# Patient Record
Sex: MALE | Race: WHITE | NOT HISPANIC OR LATINO
[De-identification: names, ages, dates, MRNs, and addresses within clinical notes are randomized per-mention and may not be internally consistent; named-entity substitution may affect disease eponyms.]

---

## 2017-01-09 ENCOUNTER — APPOINTMENT (OUTPATIENT)
Dept: PHARMACY | Facility: CLINIC | Age: 82
End: 2017-01-09

## 2017-04-06 ENCOUNTER — APPOINTMENT (OUTPATIENT)
Dept: OTOLARYNGOLOGY | Facility: CLINIC | Age: 82
End: 2017-04-06

## 2017-04-06 VITALS
DIASTOLIC BLOOD PRESSURE: 78 MMHG | HEART RATE: 66 BPM | HEIGHT: 69 IN | SYSTOLIC BLOOD PRESSURE: 116 MMHG | WEIGHT: 198 LBS | BODY MASS INDEX: 29.33 KG/M2

## 2017-07-13 ENCOUNTER — APPOINTMENT (OUTPATIENT)
Dept: OTOLARYNGOLOGY | Facility: CLINIC | Age: 82
End: 2017-07-13

## 2017-07-13 VITALS
SYSTOLIC BLOOD PRESSURE: 132 MMHG | BODY MASS INDEX: 29.62 KG/M2 | HEART RATE: 54 BPM | HEIGHT: 69 IN | DIASTOLIC BLOOD PRESSURE: 75 MMHG | WEIGHT: 200 LBS

## 2017-07-13 DIAGNOSIS — J38.00 PARALYSIS OF VOCAL CORDS AND LARYNX, UNSPECIFIED: ICD-10-CM

## 2017-07-13 RX ORDER — METHYLPREDNISOLONE 4 MG/1
4 TABLET ORAL
Qty: 1 | Refills: 3 | Status: ACTIVE | COMMUNITY
Start: 2017-07-13 | End: 1900-01-01

## 2017-08-18 ENCOUNTER — APPOINTMENT (OUTPATIENT)
Dept: OTOLARYNGOLOGY | Facility: CLINIC | Age: 82
End: 2017-08-18
Payer: MEDICARE

## 2017-08-18 VITALS
SYSTOLIC BLOOD PRESSURE: 125 MMHG | HEART RATE: 68 BPM | HEIGHT: 69 IN | BODY MASS INDEX: 29.62 KG/M2 | DIASTOLIC BLOOD PRESSURE: 81 MMHG | WEIGHT: 200 LBS

## 2017-08-18 PROCEDURE — 31231 NASAL ENDOSCOPY DX: CPT

## 2017-08-18 PROCEDURE — 99214 OFFICE O/P EST MOD 30 MIN: CPT | Mod: 25

## 2017-08-18 PROCEDURE — 69210 REMOVE IMPACTED EAR WAX UNI: CPT

## 2017-08-18 RX ORDER — CIPROFLOXACIN 3 MG/ML
0.3 SOLUTION OPHTHALMIC
Qty: 5 | Refills: 0 | Status: ACTIVE | COMMUNITY
Start: 2017-07-24

## 2017-08-18 RX ORDER — FLUTICASONE PROPIONATE 50 UG/1
50 SPRAY, METERED NASAL DAILY
Qty: 48 | Refills: 3 | Status: ACTIVE | COMMUNITY
Start: 2017-08-18 | End: 1900-01-01

## 2017-10-26 ENCOUNTER — APPOINTMENT (OUTPATIENT)
Dept: OTOLARYNGOLOGY | Facility: CLINIC | Age: 82
End: 2017-10-26
Payer: MEDICARE

## 2017-10-26 VITALS
HEIGHT: 69 IN | HEART RATE: 56 BPM | BODY MASS INDEX: 29.62 KG/M2 | SYSTOLIC BLOOD PRESSURE: 120 MMHG | DIASTOLIC BLOOD PRESSURE: 79 MMHG | WEIGHT: 200 LBS

## 2017-10-26 DIAGNOSIS — R09.82 POSTNASAL DRIP: ICD-10-CM

## 2017-10-26 DIAGNOSIS — R05 COUGH: ICD-10-CM

## 2017-10-26 PROCEDURE — 69210 REMOVE IMPACTED EAR WAX UNI: CPT

## 2017-10-26 PROCEDURE — 99214 OFFICE O/P EST MOD 30 MIN: CPT | Mod: 25

## 2018-01-11 ENCOUNTER — APPOINTMENT (OUTPATIENT)
Dept: OTOLARYNGOLOGY | Facility: CLINIC | Age: 83
End: 2018-01-11
Payer: MEDICARE

## 2018-01-11 VITALS
BODY MASS INDEX: 29.92 KG/M2 | SYSTOLIC BLOOD PRESSURE: 115 MMHG | WEIGHT: 202 LBS | DIASTOLIC BLOOD PRESSURE: 66 MMHG | HEIGHT: 69 IN | HEART RATE: 73 BPM

## 2018-01-11 PROCEDURE — 69210 REMOVE IMPACTED EAR WAX UNI: CPT

## 2018-01-11 PROCEDURE — 99214 OFFICE O/P EST MOD 30 MIN: CPT | Mod: 25

## 2018-04-05 ENCOUNTER — APPOINTMENT (OUTPATIENT)
Dept: OTOLARYNGOLOGY | Facility: CLINIC | Age: 83
End: 2018-04-05
Payer: MEDICARE

## 2018-04-05 VITALS
WEIGHT: 204 LBS | HEART RATE: 66 BPM | HEIGHT: 69 IN | DIASTOLIC BLOOD PRESSURE: 71 MMHG | BODY MASS INDEX: 30.21 KG/M2 | SYSTOLIC BLOOD PRESSURE: 127 MMHG

## 2018-04-05 DIAGNOSIS — J30.9 ALLERGIC RHINITIS, UNSPECIFIED: ICD-10-CM

## 2018-04-05 PROCEDURE — 69210 REMOVE IMPACTED EAR WAX UNI: CPT

## 2018-04-05 PROCEDURE — 99214 OFFICE O/P EST MOD 30 MIN: CPT | Mod: 25

## 2018-04-05 RX ORDER — FLUTICASONE PROPIONATE 50 UG/1
50 SPRAY, METERED NASAL DAILY
Qty: 3 | Refills: 3 | Status: ACTIVE | COMMUNITY
Start: 2018-04-05 | End: 1900-01-01

## 2018-04-12 ENCOUNTER — MEDICATION RENEWAL (OUTPATIENT)
Age: 83
End: 2018-04-12

## 2018-04-12 RX ORDER — FLUTICASONE PROPIONATE 50 UG/1
50 SPRAY, METERED NASAL DAILY
Qty: 1 | Refills: 3 | Status: ACTIVE | COMMUNITY
Start: 2018-04-12 | End: 1900-01-01

## 2018-07-12 ENCOUNTER — APPOINTMENT (OUTPATIENT)
Dept: OTOLARYNGOLOGY | Facility: CLINIC | Age: 83
End: 2018-07-12
Payer: MEDICARE

## 2018-07-12 VITALS
HEART RATE: 55 BPM | HEIGHT: 69 IN | SYSTOLIC BLOOD PRESSURE: 123 MMHG | DIASTOLIC BLOOD PRESSURE: 81 MMHG | BODY MASS INDEX: 29.33 KG/M2 | WEIGHT: 198 LBS

## 2018-07-12 PROCEDURE — 99214 OFFICE O/P EST MOD 30 MIN: CPT | Mod: 25

## 2018-07-12 PROCEDURE — 92557 COMPREHENSIVE HEARING TEST: CPT

## 2018-07-12 PROCEDURE — 92567 TYMPANOMETRY: CPT

## 2018-07-12 PROCEDURE — 69210 REMOVE IMPACTED EAR WAX UNI: CPT

## 2018-09-24 RX ORDER — FLUTICASONE PROPIONATE 50 UG/1
50 SPRAY, METERED NASAL DAILY
Qty: 1 | Refills: 3 | Status: ACTIVE | COMMUNITY
Start: 2018-09-24 | End: 1900-01-01

## 2018-10-11 ENCOUNTER — APPOINTMENT (OUTPATIENT)
Dept: OTOLARYNGOLOGY | Facility: CLINIC | Age: 83
End: 2018-10-11
Payer: MEDICARE

## 2018-10-11 VITALS
HEIGHT: 69 IN | WEIGHT: 198 LBS | DIASTOLIC BLOOD PRESSURE: 73 MMHG | SYSTOLIC BLOOD PRESSURE: 114 MMHG | HEART RATE: 61 BPM | BODY MASS INDEX: 29.33 KG/M2

## 2018-10-11 DIAGNOSIS — J34.2 DEVIATED NASAL SEPTUM: ICD-10-CM

## 2018-10-11 PROCEDURE — 99214 OFFICE O/P EST MOD 30 MIN: CPT | Mod: 25

## 2018-10-11 PROCEDURE — 31231 NASAL ENDOSCOPY DX: CPT

## 2018-10-11 PROCEDURE — 69210 REMOVE IMPACTED EAR WAX UNI: CPT

## 2019-01-14 ENCOUNTER — APPOINTMENT (OUTPATIENT)
Dept: OTOLARYNGOLOGY | Facility: CLINIC | Age: 84
End: 2019-01-14
Payer: MEDICARE

## 2019-01-14 VITALS
HEART RATE: 60 BPM | SYSTOLIC BLOOD PRESSURE: 132 MMHG | WEIGHT: 198 LBS | DIASTOLIC BLOOD PRESSURE: 81 MMHG | BODY MASS INDEX: 29.33 KG/M2 | HEIGHT: 69 IN

## 2019-01-14 DIAGNOSIS — R09.81 NASAL CONGESTION: ICD-10-CM

## 2019-01-14 DIAGNOSIS — H61.23 IMPACTED CERUMEN, BILATERAL: ICD-10-CM

## 2019-01-14 PROCEDURE — 69210 REMOVE IMPACTED EAR WAX UNI: CPT

## 2019-01-14 PROCEDURE — 31231 NASAL ENDOSCOPY DX: CPT

## 2019-01-14 PROCEDURE — 99214 OFFICE O/P EST MOD 30 MIN: CPT | Mod: 25

## 2019-01-14 NOTE — HISTORY OF PRESENT ILLNESS
[de-identified] : Patient is here today with no changes in hearing but has issues with batteries int he hearing aids. He does not have any ear pain or pressure and is not having any ringing in the ears. He does not have any issues with ear itching. He does not have any problems with nasal congsetion or runny nose . He has stopped using the flonase since the last visit. He does not have any nosebleeds.

## 2019-01-14 NOTE — PHYSICAL EXAM
[Nasal Endoscopy Performed] : nasal endoscopy was performed, see procedure section for findings [] : septum deviated to the right [Midline] : trachea located in midline position [Normal] : no rashes

## 2019-01-14 NOTE — ASSESSMENT
[FreeTextEntry1] : Patient recently had a new set of hearing aids here for evaluation for cerumen which is massive bilaterally which was curetted out. He also stopped his nasal sprays last visit in an attempt to see if we can wean her off of them for his history of postnasal drip. Endoscopically his deviated septum persist however there is no evidence of tumors masses or polyps in his turbinates are not swelling neck and to continue to be nasal spray unless his symptoms were to return if so we will restart the 4 month. Otherwise she will follow up and see us in 6 months.

## 2019-07-18 ENCOUNTER — APPOINTMENT (OUTPATIENT)
Dept: OTOLARYNGOLOGY | Facility: CLINIC | Age: 84
End: 2019-07-18
Payer: MEDICARE

## 2019-07-18 VITALS
SYSTOLIC BLOOD PRESSURE: 114 MMHG | WEIGHT: 199 LBS | HEIGHT: 69 IN | BODY MASS INDEX: 29.47 KG/M2 | DIASTOLIC BLOOD PRESSURE: 70 MMHG | HEART RATE: 69 BPM

## 2019-07-18 DIAGNOSIS — H90.3 SENSORINEURAL HEARING LOSS, BILATERAL: ICD-10-CM

## 2019-07-18 PROCEDURE — 99214 OFFICE O/P EST MOD 30 MIN: CPT | Mod: 25

## 2019-07-18 PROCEDURE — 92557 COMPREHENSIVE HEARING TEST: CPT

## 2019-07-18 PROCEDURE — 69210 REMOVE IMPACTED EAR WAX UNI: CPT

## 2019-07-18 PROCEDURE — 92567 TYMPANOMETRY: CPT

## 2019-07-18 NOTE — ASSESSMENT
[FreeTextEntry1] : Patient with cerumen impaction curetted out repeat hearing test shows his hearing is stable and will follow up and see us as needed.

## 2019-07-18 NOTE — HISTORY OF PRESENT ILLNESS
[de-identified] : PAtient is here today with some minor ear clogging bilaterally and feels the need for a cleaning. He is not having nay changes in hearing but does admit that he has been turning the TV up louder than usual. he does not have any ringing in the ears or pain. He is not having his ear at home. He does not have any nasal congsetion or runny nose

## 2019-09-25 ENCOUNTER — APPOINTMENT (OUTPATIENT)
Dept: OPHTHALMOLOGY | Facility: CLINIC | Age: 84
End: 2019-09-25
Payer: MEDICARE

## 2019-09-25 ENCOUNTER — NON-APPOINTMENT (OUTPATIENT)
Age: 84
End: 2019-09-25

## 2019-09-25 PROCEDURE — 92002 INTRM OPH EXAM NEW PATIENT: CPT

## 2019-09-25 PROCEDURE — 92012 INTRM OPH EXAM EST PATIENT: CPT

## 2019-10-15 ENCOUNTER — APPOINTMENT (OUTPATIENT)
Dept: OTOLARYNGOLOGY | Facility: CLINIC | Age: 84
End: 2019-10-15
Payer: MEDICARE

## 2019-10-15 VITALS
HEIGHT: 69 IN | BODY MASS INDEX: 28.88 KG/M2 | SYSTOLIC BLOOD PRESSURE: 116 MMHG | HEART RATE: 57 BPM | WEIGHT: 195 LBS | DIASTOLIC BLOOD PRESSURE: 77 MMHG

## 2019-10-15 PROCEDURE — 69210 REMOVE IMPACTED EAR WAX UNI: CPT

## 2019-10-15 PROCEDURE — 99214 OFFICE O/P EST MOD 30 MIN: CPT | Mod: 25

## 2019-10-15 NOTE — HISTORY OF PRESENT ILLNESS
[de-identified] : Patient is using his hearing aids bilaterally with moderate ear clogging bilaterally but great benefit from the aids. He does not have any issues with itching in the ears or dizziness. He is overall doing well. He does not have nay nasal congestion right now/ He does not have any seaosnal alelrgies

## 2019-10-15 NOTE — ASSESSMENT
[FreeTextEntry1] : Patient follows up cerumen impaction bilaterally curetted out will follow up with us as needed

## 2020-01-09 ENCOUNTER — NON-APPOINTMENT (OUTPATIENT)
Age: 85
End: 2020-01-09

## 2020-01-09 ENCOUNTER — APPOINTMENT (OUTPATIENT)
Dept: OPHTHALMOLOGY | Facility: CLINIC | Age: 85
End: 2020-01-09
Payer: MEDICARE

## 2020-01-09 PROCEDURE — 99212 OFFICE O/P EST SF 10 MIN: CPT

## 2020-01-14 ENCOUNTER — APPOINTMENT (OUTPATIENT)
Dept: OTOLARYNGOLOGY | Facility: CLINIC | Age: 85
End: 2020-01-14
Payer: MEDICARE

## 2020-01-14 VITALS
HEIGHT: 69 IN | WEIGHT: 191 LBS | SYSTOLIC BLOOD PRESSURE: 139 MMHG | BODY MASS INDEX: 28.29 KG/M2 | HEART RATE: 61 BPM | DIASTOLIC BLOOD PRESSURE: 88 MMHG

## 2020-01-14 PROCEDURE — 69210 REMOVE IMPACTED EAR WAX UNI: CPT

## 2020-01-14 PROCEDURE — 99214 OFFICE O/P EST MOD 30 MIN: CPT | Mod: 25

## 2020-01-14 NOTE — ASSESSMENT
[FreeTextEntry1] : Patient for an evaluation cerumen impaction bilaterally curetted out otherwise normal exam no change in his hearing he will get his hearing test from his cardiologist.

## 2020-01-14 NOTE — PHYSICAL EXAM
[] : septum deviated to the right [Nasal Endoscopy Performed] : nasal endoscopy was performed, see procedure section for findings [Midline] : trachea located in midline position [Normal] : no rashes

## 2020-01-14 NOTE — HISTORY OF PRESENT ILLNESS
[de-identified] : PAtient continues to have intermittent clogging. of the ears and today feels like he needs an ear cleaning. He isnot having any issues with facial pain or pressure and is not having any nasal congestion. He has nto had any changes in hearing from what he can tell and does not have any dizziness or ringing in the ears

## 2020-03-02 ENCOUNTER — OUTPATIENT (OUTPATIENT)
Dept: OUTPATIENT SERVICES | Facility: HOSPITAL | Age: 85
LOS: 1 days | End: 2020-03-02
Payer: MEDICARE

## 2020-03-02 DIAGNOSIS — E78.00 PURE HYPERCHOLESTEROLEMIA, UNSPECIFIED: ICD-10-CM

## 2020-03-02 PROCEDURE — 93018 CV STRESS TEST I&R ONLY: CPT

## 2020-03-02 PROCEDURE — 93016 CV STRESS TEST SUPVJ ONLY: CPT

## 2020-03-02 PROCEDURE — A9500: CPT

## 2020-03-02 PROCEDURE — 78452 HT MUSCLE IMAGE SPECT MULT: CPT | Mod: 26

## 2020-03-02 PROCEDURE — 78452 HT MUSCLE IMAGE SPECT MULT: CPT

## 2020-03-02 PROCEDURE — 93017 CV STRESS TEST TRACING ONLY: CPT

## 2020-03-10 ENCOUNTER — OUTPATIENT (OUTPATIENT)
Dept: OUTPATIENT SERVICES | Facility: HOSPITAL | Age: 85
LOS: 1 days | End: 2020-03-10
Payer: MEDICARE

## 2020-03-10 DIAGNOSIS — I49.9 CARDIAC ARRHYTHMIA, UNSPECIFIED: ICD-10-CM

## 2020-03-10 PROCEDURE — 93227 XTRNL ECG REC<48 HR R&I: CPT

## 2020-03-16 PROCEDURE — 93225 XTRNL ECG REC<48 HRS REC: CPT

## 2020-04-15 ENCOUNTER — APPOINTMENT (OUTPATIENT)
Dept: OPHTHALMOLOGY | Facility: CLINIC | Age: 85
End: 2020-04-15

## 2020-04-16 ENCOUNTER — APPOINTMENT (OUTPATIENT)
Dept: OTOLARYNGOLOGY | Facility: CLINIC | Age: 85
End: 2020-04-16

## 2020-07-16 ENCOUNTER — APPOINTMENT (OUTPATIENT)
Dept: OTOLARYNGOLOGY | Facility: CLINIC | Age: 85
End: 2020-07-16
Payer: MEDICARE

## 2020-07-16 VITALS
DIASTOLIC BLOOD PRESSURE: 73 MMHG | HEIGHT: 69 IN | HEART RATE: 58 BPM | TEMPERATURE: 98.5 F | WEIGHT: 177 LBS | BODY MASS INDEX: 26.22 KG/M2 | SYSTOLIC BLOOD PRESSURE: 116 MMHG

## 2020-07-16 PROCEDURE — 99214 OFFICE O/P EST MOD 30 MIN: CPT | Mod: 25

## 2020-07-16 PROCEDURE — 69210 REMOVE IMPACTED EAR WAX UNI: CPT

## 2020-07-16 NOTE — HISTORY OF PRESENT ILLNESS
[de-identified] : PAtient is doing well with only minor ear clogging bilaterally and feels he should have an ear cleaning. he does not have any issues with ringing in the ears or dizziness and is nto having any changes in hearing. he does not have any issues with nasal congestion or runny nose right now

## 2020-07-16 NOTE — ASSESSMENT
[FreeTextEntry1] : Patient follows up cerumen impaction bilaterally otherwise doing really well has lost some weight during COVID and looks great actually been working out he will follow-up and see us as needed.

## 2020-07-20 ENCOUNTER — APPOINTMENT (OUTPATIENT)
Dept: OPHTHALMOLOGY | Facility: CLINIC | Age: 85
End: 2020-07-20
Payer: MEDICARE

## 2020-07-20 ENCOUNTER — NON-APPOINTMENT (OUTPATIENT)
Age: 85
End: 2020-07-20

## 2020-07-20 PROCEDURE — 92012 INTRM OPH EXAM EST PATIENT: CPT

## 2020-08-08 ENCOUNTER — APPOINTMENT (OUTPATIENT)
Dept: DISASTER EMERGENCY | Facility: CLINIC | Age: 85
End: 2020-08-08

## 2020-08-08 DIAGNOSIS — Z01.818 ENCOUNTER FOR OTHER PREPROCEDURAL EXAMINATION: ICD-10-CM

## 2020-08-09 LAB — SARS-COV-2 N GENE NPH QL NAA+PROBE: NOT DETECTED

## 2020-08-10 ENCOUNTER — TRANSCRIPTION ENCOUNTER (OUTPATIENT)
Age: 85
End: 2020-08-10

## 2020-08-10 VITALS
SYSTOLIC BLOOD PRESSURE: 160 MMHG | WEIGHT: 171.08 LBS | TEMPERATURE: 97 F | HEART RATE: 58 BPM | HEIGHT: 68 IN | DIASTOLIC BLOOD PRESSURE: 81 MMHG | RESPIRATION RATE: 18 BRPM | OXYGEN SATURATION: 99 %

## 2020-08-10 NOTE — PRE-OP CHECKLIST - PATIENT'S PERSONAL PROPERTY REMOVED
hearing aids/b/l hearing aides; right eye contact lenses hearing aids/b/l hearing aides; right eye contact lenses ( non prescriptive)

## 2020-08-11 ENCOUNTER — INPATIENT (INPATIENT)
Facility: HOSPITAL | Age: 85
LOS: 3 days | Discharge: HOME | DRG: 336 | End: 2020-08-15
Attending: SURGERY | Admitting: SURGERY
Payer: MEDICARE

## 2020-08-11 ENCOUNTER — RESULT REVIEW (OUTPATIENT)
Age: 85
End: 2020-08-11

## 2020-08-11 DIAGNOSIS — Z98.890 OTHER SPECIFIED POSTPROCEDURAL STATES: Chronic | ICD-10-CM

## 2020-08-11 DIAGNOSIS — Z41.9 ENCOUNTER FOR PROCEDURE FOR PURPOSES OTHER THAN REMEDYING HEALTH STATE, UNSPECIFIED: Chronic | ICD-10-CM

## 2020-08-11 LAB — GLUCOSE BLDC GLUCOMTR-MCNC: 98 MG/DL — SIGNIFICANT CHANGE UP (ref 70–99)

## 2020-08-11 PROCEDURE — 88302 TISSUE EXAM BY PATHOLOGIST: CPT | Mod: 26

## 2020-08-11 PROCEDURE — 88309 TISSUE EXAM BY PATHOLOGIST: CPT | Mod: 26

## 2020-08-11 RX ORDER — HEPARIN SODIUM 5000 [USP'U]/ML
5000 INJECTION INTRAVENOUS; SUBCUTANEOUS EVERY 8 HOURS
Refills: 0 | Status: DISCONTINUED | OUTPATIENT
Start: 2020-08-11 | End: 2020-08-15

## 2020-08-11 RX ORDER — CIPROFLOXACIN HCL 0.3 %
1 DROPS OPHTHALMIC (EYE)
Qty: 0 | Refills: 0 | DISCHARGE

## 2020-08-11 RX ORDER — ACETAMINOPHEN 500 MG
1000 TABLET ORAL ONCE
Refills: 0 | Status: COMPLETED | OUTPATIENT
Start: 2020-08-12 | End: 2020-08-12

## 2020-08-11 RX ORDER — ACETAMINOPHEN 500 MG
1000 TABLET ORAL ONCE
Refills: 0 | Status: COMPLETED | OUTPATIENT
Start: 2020-08-11 | End: 2020-08-11

## 2020-08-11 RX ORDER — ONDANSETRON 8 MG/1
4 TABLET, FILM COATED ORAL EVERY 6 HOURS
Refills: 0 | Status: DISCONTINUED | OUTPATIENT
Start: 2020-08-11 | End: 2020-08-15

## 2020-08-11 RX ORDER — PREDNISOLONE SODIUM PHOSPHATE 1 %
1 DROPS OPHTHALMIC (EYE) DAILY
Refills: 0 | Status: DISCONTINUED | OUTPATIENT
Start: 2020-08-12 | End: 2020-08-15

## 2020-08-11 RX ORDER — LATANOPROST 0.05 MG/ML
1 SOLUTION/ DROPS OPHTHALMIC; TOPICAL
Qty: 0 | Refills: 0 | DISCHARGE

## 2020-08-11 RX ORDER — DORZOLAMIDE HYDROCHLORIDE TIMOLOL MALEATE 20; 5 MG/ML; MG/ML
1 SOLUTION/ DROPS OPHTHALMIC
Refills: 0 | Status: DISCONTINUED | OUTPATIENT
Start: 2020-08-11 | End: 2020-08-15

## 2020-08-11 RX ORDER — HYDROMORPHONE HYDROCHLORIDE 2 MG/ML
0.25 INJECTION INTRAMUSCULAR; INTRAVENOUS; SUBCUTANEOUS
Refills: 0 | Status: DISCONTINUED | OUTPATIENT
Start: 2020-08-11 | End: 2020-08-15

## 2020-08-11 RX ORDER — TAMSULOSIN HYDROCHLORIDE 0.4 MG/1
0.4 CAPSULE ORAL AT BEDTIME
Refills: 0 | Status: DISCONTINUED | OUTPATIENT
Start: 2020-08-11 | End: 2020-08-15

## 2020-08-11 RX ORDER — LATANOPROST 0.05 MG/ML
1 SOLUTION/ DROPS OPHTHALMIC; TOPICAL AT BEDTIME
Refills: 0 | Status: DISCONTINUED | OUTPATIENT
Start: 2020-08-11 | End: 2020-08-15

## 2020-08-11 RX ORDER — SODIUM CHLORIDE 9 MG/ML
1000 INJECTION, SOLUTION INTRAVENOUS
Refills: 0 | Status: DISCONTINUED | OUTPATIENT
Start: 2020-08-11 | End: 2020-08-15

## 2020-08-11 RX ORDER — PREDNISOLONE SODIUM PHOSPHATE 1 %
DROPS OPHTHALMIC (EYE)
Refills: 0 | Status: DISCONTINUED | OUTPATIENT
Start: 2020-08-11 | End: 2020-08-15

## 2020-08-11 RX ORDER — DORZOLAMIDE HYDROCHLORIDE TIMOLOL MALEATE 20; 5 MG/ML; MG/ML
1 SOLUTION/ DROPS OPHTHALMIC
Qty: 0 | Refills: 0 | DISCHARGE

## 2020-08-11 RX ORDER — PREDNISOLONE SODIUM PHOSPHATE 1 %
1 DROPS OPHTHALMIC (EYE)
Qty: 0 | Refills: 0 | DISCHARGE

## 2020-08-11 RX ORDER — CEFAZOLIN SODIUM 1 G
1000 VIAL (EA) INJECTION EVERY 8 HOURS
Refills: 0 | Status: DISCONTINUED | OUTPATIENT
Start: 2020-08-11 | End: 2020-08-11

## 2020-08-11 RX ORDER — LISINOPRIL 2.5 MG/1
1 TABLET ORAL
Qty: 0 | Refills: 0 | DISCHARGE

## 2020-08-11 RX ORDER — TAMSULOSIN HYDROCHLORIDE 0.4 MG/1
1 CAPSULE ORAL
Qty: 0 | Refills: 0 | DISCHARGE

## 2020-08-11 RX ORDER — PREDNISOLONE SODIUM PHOSPHATE 1 %
1 DROPS OPHTHALMIC (EYE) ONCE
Refills: 0 | Status: COMPLETED | OUTPATIENT
Start: 2020-08-11 | End: 2020-08-11

## 2020-08-11 RX ORDER — CEFAZOLIN SODIUM 1 G
1000 VIAL (EA) INJECTION EVERY 8 HOURS
Refills: 0 | Status: COMPLETED | OUTPATIENT
Start: 2020-08-11 | End: 2020-08-12

## 2020-08-11 RX ORDER — CIPROFLOXACIN HCL 0.3 %
1 DROPS OPHTHALMIC (EYE)
Refills: 0 | Status: DISCONTINUED | OUTPATIENT
Start: 2020-08-11 | End: 2020-08-15

## 2020-08-11 RX ADMIN — Medication 1 DROP(S): at 18:52

## 2020-08-11 RX ADMIN — Medication 100 MILLIGRAM(S): at 21:42

## 2020-08-11 RX ADMIN — HYDROMORPHONE HYDROCHLORIDE 0.25 MILLIGRAM(S): 2 INJECTION INTRAMUSCULAR; INTRAVENOUS; SUBCUTANEOUS at 14:25

## 2020-08-11 RX ADMIN — HYDROMORPHONE HYDROCHLORIDE 0.25 MILLIGRAM(S): 2 INJECTION INTRAMUSCULAR; INTRAVENOUS; SUBCUTANEOUS at 14:42

## 2020-08-11 RX ADMIN — Medication 1000 MILLIGRAM(S): at 18:55

## 2020-08-11 RX ADMIN — LATANOPROST 1 DROP(S): 0.05 SOLUTION/ DROPS OPHTHALMIC; TOPICAL at 21:43

## 2020-08-11 RX ADMIN — DORZOLAMIDE HYDROCHLORIDE TIMOLOL MALEATE 1 DROP(S): 20; 5 SOLUTION/ DROPS OPHTHALMIC at 18:53

## 2020-08-11 RX ADMIN — Medication 400 MILLIGRAM(S): at 18:55

## 2020-08-11 RX ADMIN — HEPARIN SODIUM 5000 UNIT(S): 5000 INJECTION INTRAVENOUS; SUBCUTANEOUS at 21:42

## 2020-08-11 RX ADMIN — Medication 400 MILLIGRAM(S): at 23:54

## 2020-08-11 NOTE — BRIEF OPERATIVE NOTE - OPERATION/FINDINGS
Approximately 2cm ventral hernia identified with incarcerated fat, which was excised. Robot-assisted laparoscopic enterolysis performed, adhesions in pelvis and lower quadrants. Small bowel mass identified in distal ileum; this portion of small bowel had intussuscepted into proximal small bowel and was easily reduced. Small bowel brought up through small midline incision and portion with mass/intussusception divided with blue load IVA stapler. Mesentery divided with ligasure. Small bowel side to side functional end to end anastomosis created with IVA blue load stapler. Anastomosis found to be patent. Mesenteric defect closed with vicryl suture. Silk suture placed at apex of anastomosis. Hemostasis confirmed. Umbilical hernia, also fat containing, incorporated into midline incision, repaired and abdomen closed.

## 2020-08-11 NOTE — H&P ADULT - HISTORY OF PRESENT ILLNESS
Pt is a 89 y/o M w/ PMH of prostate Cancer (1999 s/p radiation and recurrence in 2016), right eye blindness, and HLD. Routine CT scan for prostate Ca in January 2020 revealed a small bowel mass (possible lipoma).  No hx of obstructive symptoms.     Pre op h/h: 12.3/37.4  Cr: 1.14 Pt is a 87 y/o M w/ PMH of prostate Cancer (1999 s/p radiation and recurrence in 2016), right eye blindness, HLD, and known small bowel mass. First diagnosed with small bowel mass in 2016. Pt has had several episodes of partial small bowel obstruction. Mass has been monitored with CT yearly and has increased in size recently. Presents today for small bowel resection. No complaints today.     Pre op h/h: 12.3/37.4  Cr: 1.14

## 2020-08-11 NOTE — H&P ADULT - NSICDXPASTSURGICALHX_GEN_ALL_CORE_FT
PAST SURGICAL HISTORY:  Elective surgery rotator cuff right    H/O eye surgery multiple} corneal implants cataract removal b/l

## 2020-08-11 NOTE — PROGRESS NOTE ADULT - ASSESSMENT
Pt is a 87 y/o M w/ PMH of prostate Cancer (1999 s/p radiation and recurrence in 2016), right eye blindness, HLD, and known small bowel mass now s/p robotic assisted small bowel resection with Small bowel side to side functional end to end anastomosis on 8/11    NPO/IVF  pain/nausea control  ancef x 3 doses  home meds  regional bed  SQ/HSCDs

## 2020-08-11 NOTE — H&P ADULT - NSICDXPASTMEDICALHX_GEN_ALL_CORE_FT
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

## 2020-08-11 NOTE — H&P ADULT - ASSESSMENT
Pt is a 89 y/o M w/ PMH of prostate Cancer (1999 s/p radiation and recurrence in 2016), right eye blindness, and HLD. Routine CT scan for prostate Ca in January 2020 revealed a small bowel mass (possible lipoma).  No hx of obstructive symptoms.   - to OR Pt is a 89 y/o M w/ PMH of prostate Cancer (1999 s/p radiation and recurrence in 2016), right eye blindness, HLD, and known small bowel mass. First diagnosed with small bowel mass in 2016. Pt has had several episodes of partial small bowel obstruction. Mass has been monitored with CT yearly and has increased in size recently. Presents today for small bowel resection. No complaints today.     - to OR

## 2020-08-11 NOTE — PACU DISCHARGE NOTE - COMMENTS
pt aao x3.  VSS.  lap sites to abd x3 with dermabond intact.  pt using incentive spirometry.  IVF infusing.  report given to RN on 9 uris.  pt to go to [id number] via bed with transport

## 2020-08-11 NOTE — BRIEF OPERATIVE NOTE - NSICDXBRIEFPREOP_GEN_ALL_CORE_FT
PRE-OP DIAGNOSIS:  Ventral hernia 11-Aug-2020 14:02:20  Ana Elliott  Small bowel mass 11-Aug-2020 14:02:12  Ana Elliott

## 2020-08-11 NOTE — PROGRESS NOTE ADULT - SUBJECTIVE AND OBJECTIVE BOX
Pre-Op Dx: SBO  Procedure: Small bowel resection, Repair, hernia, umbilical, adult Ventral hernia repair      Surgeon: Dr Aime Streeter    Subjective: Patient seen and examined in PACU, feels well, answers questions. No complains of nausea/vomiting/Dizziness/severe abdo pain. On exam, soft, ND, NT, incisions covered in dermabond and no overt discharge or bleeding.       Vital Signs Last 24 Hrs  T(C): 37 (11 Aug 2020 15:30), Max: 37 (11 Aug 2020 15:30)  T(F): 98.6 (11 Aug 2020 15:30), Max: 98.6 (11 Aug 2020 15:30)  HR: 85 (11 Aug 2020 16:00) (63 - 85)  BP: 116/60 (11 Aug 2020 16:00) (113/60 - 134/66)  BP(mean): 82 (11 Aug 2020 16:00) (81 - 93)  RR: 18 (11 Aug 2020 16:00) (10 - 20)  SpO2: 97% (11 Aug 2020 16:00) (96% - 99%)    Physical Exam:  General: NAD, resting comfortably in bed  Pulmonary: Nonlabored breathing, no respiratory distress  Cardiovascular: NSR  Abdominal: soft, NT/ND  Extremities: WWP, normal strength  Neuro: A/O x 3, CNs II-XII grossly intact, no focal deficits, normal sensation  Pulses: palpable distal pulses

## 2020-08-11 NOTE — BRIEF OPERATIVE NOTE - NSICDXBRIEFPOSTOP_GEN_ALL_CORE_FT
POST-OP DIAGNOSIS:  Hernia, ventral 11-Aug-2020 14:02:36  Ana Elliott  Small bowel mass 11-Aug-2020 14:02:29  Ana Elliott

## 2020-08-11 NOTE — BRIEF OPERATIVE NOTE - NSICDXBRIEFPROCEDURE_GEN_ALL_CORE_FT
PROCEDURES:  Repair, hernia, umbilical, adult 11-Aug-2020 14:02:03  Ana Elliott  Ventral hernia repair 11-Aug-2020 14:01:57  Ana Elliott  Small bowel resection 11-Aug-2020 14:01:50  Ana Elliott

## 2020-08-12 LAB
ANION GAP SERPL CALC-SCNC: 8 MMOL/L — SIGNIFICANT CHANGE UP (ref 5–17)
BUN SERPL-MCNC: 16 MG/DL — SIGNIFICANT CHANGE UP (ref 7–23)
CALCIUM SERPL-MCNC: 8.4 MG/DL — SIGNIFICANT CHANGE UP (ref 8.4–10.5)
CHLORIDE SERPL-SCNC: 102 MMOL/L — SIGNIFICANT CHANGE UP (ref 96–108)
CO2 SERPL-SCNC: 27 MMOL/L — SIGNIFICANT CHANGE UP (ref 22–31)
CREAT SERPL-MCNC: 1.14 MG/DL — SIGNIFICANT CHANGE UP (ref 0.5–1.3)
GLUCOSE SERPL-MCNC: 107 MG/DL — HIGH (ref 70–99)
HCT VFR BLD CALC: 37.1 % — LOW (ref 39–50)
HGB BLD-MCNC: 11.7 G/DL — LOW (ref 13–17)
MAGNESIUM SERPL-MCNC: 2.4 MG/DL — SIGNIFICANT CHANGE UP (ref 1.6–2.6)
MCHC RBC-ENTMCNC: 28.1 PG — SIGNIFICANT CHANGE UP (ref 27–34)
MCHC RBC-ENTMCNC: 31.5 GM/DL — LOW (ref 32–36)
MCV RBC AUTO: 89 FL — SIGNIFICANT CHANGE UP (ref 80–100)
NRBC # BLD: 0 /100 WBCS — SIGNIFICANT CHANGE UP (ref 0–0)
PHOSPHATE SERPL-MCNC: 3.7 MG/DL — SIGNIFICANT CHANGE UP (ref 2.5–4.5)
PLATELET # BLD AUTO: 157 K/UL — SIGNIFICANT CHANGE UP (ref 150–400)
POTASSIUM SERPL-MCNC: 4.7 MMOL/L — SIGNIFICANT CHANGE UP (ref 3.5–5.3)
POTASSIUM SERPL-SCNC: 4.7 MMOL/L — SIGNIFICANT CHANGE UP (ref 3.5–5.3)
RBC # BLD: 4.17 M/UL — LOW (ref 4.2–5.8)
RBC # FLD: 14.6 % — HIGH (ref 10.3–14.5)
SODIUM SERPL-SCNC: 137 MMOL/L — SIGNIFICANT CHANGE UP (ref 135–145)
WBC # BLD: 8.55 K/UL — SIGNIFICANT CHANGE UP (ref 3.8–10.5)
WBC # FLD AUTO: 8.55 K/UL — SIGNIFICANT CHANGE UP (ref 3.8–10.5)

## 2020-08-12 RX ADMIN — HEPARIN SODIUM 5000 UNIT(S): 5000 INJECTION INTRAVENOUS; SUBCUTANEOUS at 05:06

## 2020-08-12 RX ADMIN — DORZOLAMIDE HYDROCHLORIDE TIMOLOL MALEATE 1 DROP(S): 20; 5 SOLUTION/ DROPS OPHTHALMIC at 05:05

## 2020-08-12 RX ADMIN — HEPARIN SODIUM 5000 UNIT(S): 5000 INJECTION INTRAVENOUS; SUBCUTANEOUS at 21:24

## 2020-08-12 RX ADMIN — LATANOPROST 1 DROP(S): 0.05 SOLUTION/ DROPS OPHTHALMIC; TOPICAL at 21:25

## 2020-08-12 RX ADMIN — Medication 100 MILLIGRAM(S): at 05:04

## 2020-08-12 RX ADMIN — Medication 400 MILLIGRAM(S): at 05:04

## 2020-08-12 RX ADMIN — Medication 1 DROP(S): at 11:33

## 2020-08-12 RX ADMIN — DORZOLAMIDE HYDROCHLORIDE TIMOLOL MALEATE 1 DROP(S): 20; 5 SOLUTION/ DROPS OPHTHALMIC at 17:14

## 2020-08-12 RX ADMIN — Medication 1 DROP(S): at 05:04

## 2020-08-12 RX ADMIN — HEPARIN SODIUM 5000 UNIT(S): 5000 INJECTION INTRAVENOUS; SUBCUTANEOUS at 14:12

## 2020-08-12 RX ADMIN — TAMSULOSIN HYDROCHLORIDE 0.4 MILLIGRAM(S): 0.4 CAPSULE ORAL at 05:04

## 2020-08-12 RX ADMIN — Medication 1 DROP(S): at 17:13

## 2020-08-12 RX ADMIN — Medication 1000 MILLIGRAM(S): at 00:15

## 2020-08-12 RX ADMIN — Medication 1000 MILLIGRAM(S): at 05:34

## 2020-08-12 RX ADMIN — Medication 100 MILLIGRAM(S): at 14:12

## 2020-08-12 NOTE — PROGRESS NOTE ADULT - SUBJECTIVE AND OBJECTIVE BOX
STATUS POST:    8/11: robotic assisted small bowel resection with Small bowel side to side functional end to end anastomosis      SUBJECTIVE: Patient seen and examined bedside by chief resident.  pain well controlled. ambulating without issues. used ice chips overnight to wet mouth. denies nausea or vomiting. no flatus or BM. is urinating on his own. occasionally he has problems however getting out of bed and walking helps to initiate stream. denies pressure or bloating in abdomen from urinary retention symptoms     ceFAZolin   IVPB 1000 milliGRAM(s) IV Intermittent every 8 hours  heparin   Injectable 5000 Unit(s) SubCutaneous every 8 hours  tamsulosin 0.4 milliGRAM(s) Oral at bedtime      Vital Signs Last 24 Hrs  T(C): 36.4 (12 Aug 2020 05:17), Max: 37 (11 Aug 2020 15:30)  T(F): 97.5 (12 Aug 2020 05:17), Max: 98.6 (11 Aug 2020 15:30)  HR: 84 (12 Aug 2020 05:17) (63 - 94)  BP: 103/63 (12 Aug 2020 05:17) (97/61 - 134/66)  BP(mean): 82 (11 Aug 2020 16:00) (81 - 93)  RR: 17 (12 Aug 2020 05:17) (10 - 20)  SpO2: 99% (12 Aug 2020 05:17) (94% - 99%)  I&O's Detail    11 Aug 2020 07:01  -  12 Aug 2020 06:44  --------------------------------------------------------  IN:    lactated ringers.: 1300 mL  Total IN: 1300 mL    OUT:    Voided: 500 mL  Total OUT: 500 mL    Total NET: 800 mL          General: NAD, resting comfortably in bed  C/V: NSR  Pulm: Nonlabored breathing, no respiratory distress  Abd: soft, NT/ND.  Extrem: WWP, no edema, SCDs in place        LABS:                RADIOLOGY & ADDITIONAL STUDIES: STATUS POST:    8/11: robotic assisted small bowel resection with Small bowel side to side functional end to end anastomosis      SUBJECTIVE: Patient seen and examined bedside by chief resident.  pain well controlled. ambulating without issues. used ice chips overnight to wet mouth. denies nausea or vomiting. no flatus or BM. is urinating on his own. occasionally he has problems however getting out of bed and walking helps to initiate stream. denies pressure or bloating in abdomen from urinary retention symptoms     ceFAZolin   IVPB 1000 milliGRAM(s) IV Intermittent every 8 hours  heparin   Injectable 5000 Unit(s) SubCutaneous every 8 hours  tamsulosin 0.4 milliGRAM(s) Oral at bedtime      Vital Signs Last 24 Hrs  T(C): 36.4 (12 Aug 2020 05:17), Max: 37 (11 Aug 2020 15:30)  T(F): 97.5 (12 Aug 2020 05:17), Max: 98.6 (11 Aug 2020 15:30)  HR: 84 (12 Aug 2020 05:17) (63 - 94)  BP: 103/63 (12 Aug 2020 05:17) (97/61 - 134/66)  BP(mean): 82 (11 Aug 2020 16:00) (81 - 93)  RR: 17 (12 Aug 2020 05:17) (10 - 20)  SpO2: 99% (12 Aug 2020 05:17) (94% - 99%)  I&O's Detail    11 Aug 2020 07:01  -  12 Aug 2020 06:44  --------------------------------------------------------  IN:    lactated ringers.: 1300 mL  Total IN: 1300 mL    OUT:    Voided: 500 mL  Total OUT: 500 mL    Total NET: 800 mL          General: NAD, resting comfortably in bed  C/V: NSR  Pulm: Nonlabored breathing, no respiratory distress  Abd: soft, NT/ND. incisions are clean, dry and intact, midline supraumbilical incision with some surround ecchymosis   Extrem: WWP, no edema, SCDs in place        LABS:                RADIOLOGY & ADDITIONAL STUDIES:

## 2020-08-13 LAB
ANION GAP SERPL CALC-SCNC: 7 MMOL/L — SIGNIFICANT CHANGE UP (ref 5–17)
BUN SERPL-MCNC: 13 MG/DL — SIGNIFICANT CHANGE UP (ref 7–23)
CALCIUM SERPL-MCNC: 8.6 MG/DL — SIGNIFICANT CHANGE UP (ref 8.4–10.5)
CHLORIDE SERPL-SCNC: 106 MMOL/L — SIGNIFICANT CHANGE UP (ref 96–108)
CO2 SERPL-SCNC: 29 MMOL/L — SIGNIFICANT CHANGE UP (ref 22–31)
CREAT SERPL-MCNC: 1.14 MG/DL — SIGNIFICANT CHANGE UP (ref 0.5–1.3)
GLUCOSE SERPL-MCNC: 86 MG/DL — SIGNIFICANT CHANGE UP (ref 70–99)
HCT VFR BLD CALC: 37.9 % — LOW (ref 39–50)
HGB BLD-MCNC: 11.8 G/DL — LOW (ref 13–17)
MAGNESIUM SERPL-MCNC: 2.2 MG/DL — SIGNIFICANT CHANGE UP (ref 1.6–2.6)
MCHC RBC-ENTMCNC: 28.5 PG — SIGNIFICANT CHANGE UP (ref 27–34)
MCHC RBC-ENTMCNC: 31.1 GM/DL — LOW (ref 32–36)
MCV RBC AUTO: 91.5 FL — SIGNIFICANT CHANGE UP (ref 80–100)
NRBC # BLD: 0 /100 WBCS — SIGNIFICANT CHANGE UP (ref 0–0)
PHOSPHATE SERPL-MCNC: 2.6 MG/DL — SIGNIFICANT CHANGE UP (ref 2.5–4.5)
PLATELET # BLD AUTO: 146 K/UL — LOW (ref 150–400)
POTASSIUM SERPL-MCNC: 4.1 MMOL/L — SIGNIFICANT CHANGE UP (ref 3.5–5.3)
POTASSIUM SERPL-SCNC: 4.1 MMOL/L — SIGNIFICANT CHANGE UP (ref 3.5–5.3)
RBC # BLD: 4.14 M/UL — LOW (ref 4.2–5.8)
RBC # FLD: 14.6 % — HIGH (ref 10.3–14.5)
SODIUM SERPL-SCNC: 142 MMOL/L — SIGNIFICANT CHANGE UP (ref 135–145)
WBC # BLD: 5.53 K/UL — SIGNIFICANT CHANGE UP (ref 3.8–10.5)
WBC # FLD AUTO: 5.53 K/UL — SIGNIFICANT CHANGE UP (ref 3.8–10.5)

## 2020-08-13 RX ADMIN — TAMSULOSIN HYDROCHLORIDE 0.4 MILLIGRAM(S): 0.4 CAPSULE ORAL at 21:29

## 2020-08-13 RX ADMIN — Medication 1 DROP(S): at 12:41

## 2020-08-13 RX ADMIN — HEPARIN SODIUM 5000 UNIT(S): 5000 INJECTION INTRAVENOUS; SUBCUTANEOUS at 05:39

## 2020-08-13 RX ADMIN — HEPARIN SODIUM 5000 UNIT(S): 5000 INJECTION INTRAVENOUS; SUBCUTANEOUS at 21:29

## 2020-08-13 RX ADMIN — Medication 1 DROP(S): at 18:11

## 2020-08-13 RX ADMIN — HEPARIN SODIUM 5000 UNIT(S): 5000 INJECTION INTRAVENOUS; SUBCUTANEOUS at 14:27

## 2020-08-13 RX ADMIN — DORZOLAMIDE HYDROCHLORIDE TIMOLOL MALEATE 1 DROP(S): 20; 5 SOLUTION/ DROPS OPHTHALMIC at 05:41

## 2020-08-13 RX ADMIN — LATANOPROST 1 DROP(S): 0.05 SOLUTION/ DROPS OPHTHALMIC; TOPICAL at 21:29

## 2020-08-13 RX ADMIN — Medication 1 DROP(S): at 05:40

## 2020-08-13 RX ADMIN — SODIUM CHLORIDE 100 MILLILITER(S): 9 INJECTION, SOLUTION INTRAVENOUS at 21:29

## 2020-08-13 RX ADMIN — DORZOLAMIDE HYDROCHLORIDE TIMOLOL MALEATE 1 DROP(S): 20; 5 SOLUTION/ DROPS OPHTHALMIC at 18:11

## 2020-08-13 NOTE — PROGRESS NOTE ADULT - SUBJECTIVE AND OBJECTIVE BOX
SUBJECTIVE: Patient seen and examined bedside by chief resident, feels well. No gas no BM yet. No complain of nausea or vomiting or abdominal pain.     heparin   Injectable 5000 Unit(s) SubCutaneous every 8 hours  tamsulosin 0.4 milliGRAM(s) Oral at bedtime    MEDICATIONS  (PRN):  HYDROmorphone  Injectable 0.25 milliGRAM(s) IV Push every 30 minutes PRN Severe Pain (7 - 10)  ondansetron Injectable 4 milliGRAM(s) IV Push every 6 hours PRN Nausea      I&O's Detail    12 Aug 2020 07:01  -  13 Aug 2020 07:00  --------------------------------------------------------  IN:    lactated ringers.: 2300 mL  Total IN: 2300 mL    OUT:    Voided: 2390 mL  Total OUT: 2390 mL    Total NET: -90 mL          Vital Signs Last 24 Hrs  T(C): 36.6 (13 Aug 2020 06:08), Max: 36.7 (12 Aug 2020 13:28)  T(F): 97.8 (13 Aug 2020 06:08), Max: 98.1 (12 Aug 2020 13:28)  HR: 54 (13 Aug 2020 06:08) (51 - 71)  BP: 120/8 (13 Aug 2020 06:08) (109/68 - 123/63)  BP(mean): --  RR: 17 (13 Aug 2020 06:08) (16 - 17)  SpO2: 93% (13 Aug 2020 06:08) (93% - 97%)    General: NAD, resting comfortably in bed  C/V: Regular rate  Pulm: Nonlabored breathing, no respiratory distress  Abd: soft, ND, NT  Extrem: WWP, no edema, SCDs in place    LABS:                        11.7   8.55  )-----------( 157      ( 12 Aug 2020 07:24 )             37.1     08-12    137  |  102  |  16  ----------------------------<  107<H>  4.7   |  27  |  1.14    Ca    8.4      12 Aug 2020 07:24  Phos  3.7     08-12  Mg     2.4     08-12            RADIOLOGY & ADDITIONAL STUDIES:

## 2020-08-14 ENCOUNTER — TRANSCRIPTION ENCOUNTER (OUTPATIENT)
Age: 85
End: 2020-08-14

## 2020-08-14 VITALS
HEART RATE: 58 BPM | RESPIRATION RATE: 16 BRPM | OXYGEN SATURATION: 97 % | TEMPERATURE: 98 F | SYSTOLIC BLOOD PRESSURE: 134 MMHG | DIASTOLIC BLOOD PRESSURE: 74 MMHG

## 2020-08-14 LAB — SURGICAL PATHOLOGY STUDY: SIGNIFICANT CHANGE UP

## 2020-08-14 PROCEDURE — 80048 BASIC METABOLIC PNL TOTAL CA: CPT

## 2020-08-14 PROCEDURE — 88302 TISSUE EXAM BY PATHOLOGIST: CPT

## 2020-08-14 PROCEDURE — S2900: CPT

## 2020-08-14 PROCEDURE — 84100 ASSAY OF PHOSPHORUS: CPT

## 2020-08-14 PROCEDURE — 88309 TISSUE EXAM BY PATHOLOGIST: CPT

## 2020-08-14 PROCEDURE — C1889: CPT

## 2020-08-14 PROCEDURE — 82962 GLUCOSE BLOOD TEST: CPT

## 2020-08-14 PROCEDURE — 86901 BLOOD TYPING SEROLOGIC RH(D): CPT

## 2020-08-14 PROCEDURE — 83735 ASSAY OF MAGNESIUM: CPT

## 2020-08-14 PROCEDURE — 85027 COMPLETE CBC AUTOMATED: CPT

## 2020-08-14 PROCEDURE — 36415 COLL VENOUS BLD VENIPUNCTURE: CPT

## 2020-08-14 PROCEDURE — 86850 RBC ANTIBODY SCREEN: CPT

## 2020-08-14 RX ADMIN — HEPARIN SODIUM 5000 UNIT(S): 5000 INJECTION INTRAVENOUS; SUBCUTANEOUS at 05:21

## 2020-08-14 RX ADMIN — SODIUM CHLORIDE 100 MILLILITER(S): 9 INJECTION, SOLUTION INTRAVENOUS at 05:21

## 2020-08-14 RX ADMIN — Medication 1 DROP(S): at 05:20

## 2020-08-14 RX ADMIN — Medication 1 DROP(S): at 12:15

## 2020-08-14 RX ADMIN — DORZOLAMIDE HYDROCHLORIDE TIMOLOL MALEATE 1 DROP(S): 20; 5 SOLUTION/ DROPS OPHTHALMIC at 05:21

## 2020-08-14 NOTE — DISCHARGE NOTE PROVIDER - INSTRUCTIONS
Continue to have a soft diet. It should consist of liquids, jelly and pureed foods. When you have a bowel movement, you can advance to the regular diet that you consume.

## 2020-08-14 NOTE — DISCHARGE NOTE PROVIDER - NSDCMRMEDTOKEN_GEN_ALL_CORE_FT
ciprofloxacin 0.3% ophthalmic solution: 1 drop(s) to each affected eye once a day right eye   dorzolamide-timolol 2.23%-0.68% ophthalmic solution: 1 drop(s) to each affected eye once a day right eye  Flomax 0.4 mg oral capsule: 1 cap(s) orally once a day  latanoprost 0.005% ophthalmic solution: 1 drop(s) to each affected eye once a day (in the evening)   right eye  prednisoLONE acetate 1% ophthalmic suspension: 1 drop(s) to each affected eye once a day both y  vancomycin eye gtt: 1 drop(s) to each affected eye once a day right eye

## 2020-08-14 NOTE — PROGRESS NOTE ADULT - ASSESSMENT
Pt is a 87 y/o M w/ PMH of prostate Cancer (1999 s/p radiation and recurrence in 2016), right eye blindness, HLD, and known small bowel mass now s/p robotic assisted small bowel resection with Small bowel side to side functional end to end anastomosis on 8/11    NPO/IVF  pain/nausea control  ancef x 3 doses  home meds  regional bed  SQ/HSCDs Pt is a 89 y/o M w/ PMH of prostate Cancer (1999 s/p radiation and recurrence in 2016), right eye blindness, HLD, and known small bowel mass now s/p robotic assisted small bowel resection with Small bowel side to side functional end to end anastomosis on 8/11, no Passes Gas No BM.    D/c Home on Soft Diet and 1 week f/u  pain/nausea control  home meds

## 2020-08-14 NOTE — DISCHARGE NOTE PROVIDER - NSDCACTIVITY_GEN_ALL_CORE
Bathing allowed/Sex allowed/Stairs allowed/Walking - Indoors allowed/Showering allowed/No heavy lifting/straining/Walking - Outdoors allowed/Return to Work/School allowed

## 2020-08-14 NOTE — DISCHARGE NOTE PROVIDER - NSDCFUSCHEDAPPT_GEN_ALL_CORE_FT
GÉNESIS OVIEDO ; 10/02/2020 ; NPP Ophthal [address] [facility] ; 10/15/2020 ; NPP Otolaryng [address] [facility]

## 2020-08-14 NOTE — PROGRESS NOTE ADULT - SUBJECTIVE AND OBJECTIVE BOX
Medicine Progress Note    Patient is a 88y old  Male who presents with a chief complaint of small bowel resection (13 Aug 2020 07:15)      SUBJECTIVE / OVERNIGHT EVENTS:    ADDITIONAL REVIEW OF SYSTEMS:    MEDICATIONS  (STANDING):  ciprofloxacin  0.3% Ophthalmic Solution 1 Drop(s) Right EYE two times a day  dorzolamide 2%/timolol 0.5% Ophthalmic Solution 1 Drop(s) Right EYE two times a day  heparin   Injectable 5000 Unit(s) SubCutaneous every 8 hours  lactated ringers. 1000 milliLiter(s) (100 mL/Hr) IV Continuous <Continuous>  latanoprost 0.005% Ophthalmic Solution 1 Drop(s) Right EYE at bedtime  prednisoLONE acetate 1% Suspension 1 Drop(s) Right EYE daily  prednisoLONE acetate 1% Suspension      tamsulosin 0.4 milliGRAM(s) Oral at bedtime  Vancomycin 12.5 mg/ml ophthalmic solutio 1 Drop(s) 1 Drop(s) Right EYE daily    MEDICATIONS  (PRN):  HYDROmorphone  Injectable 0.25 milliGRAM(s) IV Push every 30 minutes PRN Severe Pain (7 - 10)  ondansetron Injectable 4 milliGRAM(s) IV Push every 6 hours PRN Nausea    CAPILLARY BLOOD GLUCOSE        I&O's Summary    13 Aug 2020 07:01  -  14 Aug 2020 07:00  --------------------------------------------------------  IN: 3260 mL / OUT: 3480 mL / NET: -220 mL        PHYSICAL EXAM:  Vital Signs Last 24 Hrs  T(C): 36.4 (14 Aug 2020 06:01), Max: 36.6 (13 Aug 2020 12:30)  T(F): 97.6 (14 Aug 2020 06:01), Max: 97.9 (13 Aug 2020 12:30)  HR: 52 (14 Aug 2020 06:01) (52 - 62)  BP: 119/71 (14 Aug 2020 06:01) (119/71 - 144/76)  BP(mean): 87 (14 Aug 2020 06:01) (87 - 99)  RR: 17 (14 Aug 2020 06:01) (16 - 17)  SpO2: 96% (14 Aug 2020 06:01) (96% - 98%)  CONSTITUTIONAL: NAD, well-developed, well-groomed  ENMT: Moist oral mucosa, no pharyngeal injection or exudates; normal dentition  RESPIRATORY: Normal respiratory effort; lungs are clear to auscultation bilaterally  CARDIOVASCULAR: Regular rate and rhythm, normal S1 and S2, no murmur/rub/gallop; No lower extremity edema; Peripheral pulses are 2+ bilaterally  ABDOMEN: Nontender to palpation, normoactive bowel sounds, no rebound/guarding; No hepatosplenomegaly  PSYCH: A+O to person, place, and time; affect appropriate  NEUROLOGY: CN 2-12 are intact and symmetric; no gross sensory deficits   SKIN: No rashes; no palpable lesions    LABS:                        11.8   5.53  )-----------( 146      ( 13 Aug 2020 07:45 )             37.9     08-13    142  |  106  |  13  ----------------------------<  86  4.1   |  29  |  1.14    Ca    8.6      13 Aug 2020 07:45  Phos  2.6     08-13  Mg     2.2     08-13                    RADIOLOGY & ADDITIONAL TESTS:  Imaging from Last 24 Hours:    Electrocardiogram/QTc Interval:    COORDINATION OF CARE:  Care Discussed with Consultants/Other Providers: SUBJECTIVE: Patient seen and examined bedside by chief resident, feels well overall. Passes Gas, no Bowel Movement yet. On exam, soft, ND, NT.     heparin   Injectable 5000 Unit(s) SubCutaneous every 8 hours  tamsulosin 0.4 milliGRAM(s) Oral at bedtime    MEDICATIONS  (PRN):  HYDROmorphone  Injectable 0.25 milliGRAM(s) IV Push every 30 minutes PRN Severe Pain (7 - 10)  ondansetron Injectable 4 milliGRAM(s) IV Push every 6 hours PRN Nausea      I&O's Detail    13 Aug 2020 07:01  -  14 Aug 2020 07:00  --------------------------------------------------------  IN:    lactated ringers.: 2300 mL    Oral Fluid: 960 mL  Total IN: 3260 mL    OUT:    Voided: 3480 mL  Total OUT: 3480 mL    Total NET: -220 mL      14 Aug 2020 07:01  -  14 Aug 2020 10:30  --------------------------------------------------------  IN:    Oral Fluid: 360 mL  Total IN: 360 mL    OUT:  Total OUT: 0 mL    Total NET: 360 mL          Vital Signs Last 24 Hrs  T(C): 36.8 (14 Aug 2020 08:07), Max: 36.8 (14 Aug 2020 08:07)  T(F): 98.2 (14 Aug 2020 08:07), Max: 98.2 (14 Aug 2020 08:07)  HR: 58 (14 Aug 2020 08:07) (52 - 62)  BP: 134/74 (14 Aug 2020 08:07) (119/71 - 144/76)  BP(mean): 87 (14 Aug 2020 06:01) (87 - 99)  RR: 16 (14 Aug 2020 08:07) (16 - 17)  SpO2: 97% (14 Aug 2020 08:07) (96% - 97%)    General: NAD, resting comfortably in bed  C/V: Regular rate  Pulm: Nonlabored breathing, no respiratory distress  Abd: soft, ND, NT  Extrem: WWP, no edema, SCDs in place    LABS:                        11.8   5.53  )-----------( 146      ( 13 Aug 2020 07:45 )             37.9     08-13    142  |  106  |  13  ----------------------------<  86  4.1   |  29  |  1.14    Ca    8.6      13 Aug 2020 07:45  Phos  2.6     08-13  Mg     2.2     08-13            RADIOLOGY & ADDITIONAL STUDIES:

## 2020-08-14 NOTE — DISCHARGE NOTE NURSING/CASE MANAGEMENT/SOCIAL WORK - NSPROEXTENSIONSOFSELF_GEN_A_NUR
eyeglasses/contact lenses/hearing aid/right contact lens
<<-----Click here for Discharge Medication Review

## 2020-08-14 NOTE — DISCHARGE NOTE PROVIDER - HOSPITAL COURSE
Perez Antonio is an 88 year old male patient of Dr. Streeter with PMH of prostate cancer (1999, treated with radiation with recurrence in 2016), right eye blindness, HLD, BPH and known small bowel mass diagnosed in 2016 presented for multiple recurrences of obstructive symptoms and noted recent increase in size of small bowel mass via yearly CT scans. Patient was taken to the OR on 8/11/20 for robot-assisted laparoscopic small bowel resection with end to end anastomosis. In the operating room, patient was found to have an approximately 2 cm ventral hernia with incarcerated fat that was excised. The small bowel mass was identified and noted to have intussuscepted into the proximal small bowel, which was easily reduced. The mass and intussusuception were resected and the small bowel was anastomosed without complications. A small fat-containing umbilical hernia was also noted and repaired. Hemostasis was achieved and patient remained hemodynamically stable. Postoperatively, the patient was admitted to the general sugery service for observation. Patient did well postoperatively.  Incisions remained intact, clean, and dry with dermabond covering them.  He ambulated well throughout hospital stay. He  was given pain & nausea medications as needed. He received heparin and 3 doses of cefazolin for prophylaxis. On post operative day 2, patient was given clear fluids and was noted to have flatus, indicating return of bowel function. Patient continued to do well and flatus continued into post operative day 3, and he was deemed to be ready for discharge on 8/14/20. He was recommended to follow up with Dr. Streeter in 1 week.

## 2020-08-14 NOTE — DISCHARGE NOTE PROVIDER - NSDCFUADDINST_GEN_ALL_CORE_FT
General Discharge Instructions:  If you have severe abdominal pain, if your abdomen feels very distended or you are not having bowel movements for a few more days now, please call Dr Arana office.   Please resume all regular home medications unless specifically advised not to take a particular medication. Also, please take any new medications as prescribed.  Please get plenty of rest, continue to ambulate several times per day, and drink adequate amounts of fluids. Avoid lifting weights greater than 5-10 lbs until you follow-up with your surgeon, who will instruct you further regarding activity restrictions.  Avoid driving or operating heavy machinery while taking pain medications.  Please follow-up with your surgeon and Primary Care Provider (PCP) as advised.  Incision Care:  *Please call your doctor or nurse practitioner if you have increased pain, swelling, redness, or drainage from the incision site.  *Avoid swimming and baths until your follow-up appointment.  *You may shower, and wash surgical incisions with a mild soap and warm water. Gently pat the area dry.  *If you have staples, they will be removed at your follow-up appointment.  *If you have steri-strips, they will fall off on their own. Please remove any remaining strips 7-10 days after surgery.    Warning Signs:  Please call your doctor or nurse practitioner if you experience the following:  *You experience new chest pain, pressure, squeezing or tightness.  *New or worsening cough, shortness of breath, or wheeze.  *If you are vomiting and cannot keep down fluids or your medications.  *You are getting dehydrated due to continued vomiting, diarrhea, or other reasons. Signs of dehydration include dry mouth, rapid heartbeat, or feeling dizzy or faint when standing.  *You see blood or dark/black material when you vomit or have a bowel movement.  *You experience burning when you urinate, have blood in your urine, or experience a discharge.  *Your pain is not improving within 8-12 hours or is not gone within 24 hours. Call or return immediately if your pain is getting worse, changes location, or moves to your chest or back.  *You have shaking chills, or fever greater than 101.5 degrees Fahrenheit or 38 degrees Celsius.  *Any change in your symptoms, or any new symptoms that concern you.

## 2020-08-14 NOTE — DISCHARGE NOTE NURSING/CASE MANAGEMENT/SOCIAL WORK - PATIENT PORTAL LINK FT
You can access the FollowMyHealth Patient Portal offered by [facility] by registering at the following website: [email or web address]. By joining [employer]’s FollowMyHealth portal, you will also be able to view your health information using other applications (apps) compatible with our system.

## 2020-08-14 NOTE — DISCHARGE NOTE PROVIDER - CARE PROVIDER_API CALL
Stephen Streeter)  Surgery  [address] [place] [address]  Phone: [phone]  Fax: [phone]  Follow Up Time: 1 week

## 2020-08-23 DIAGNOSIS — E78.5 HYPERLIPIDEMIA, UNSPECIFIED: ICD-10-CM

## 2020-08-23 DIAGNOSIS — H54.40 BLINDNESS, ONE EYE, UNSPECIFIED EYE: ICD-10-CM

## 2020-08-23 DIAGNOSIS — D17.5 BENIGN LIPOMATOUS NEOPLASM OF INTRA-ABDOMINAL ORGANS: ICD-10-CM

## 2020-08-23 DIAGNOSIS — H40.9 UNSPECIFIED GLAUCOMA: ICD-10-CM

## 2020-08-23 DIAGNOSIS — K56.1 INTUSSUSCEPTION: ICD-10-CM

## 2020-08-23 DIAGNOSIS — R10.9 UNSPECIFIED ABDOMINAL PAIN: ICD-10-CM

## 2020-08-23 DIAGNOSIS — K42.0 UMBILICAL HERNIA WITH OBSTRUCTION, WITHOUT GANGRENE: ICD-10-CM

## 2020-08-23 DIAGNOSIS — R33.9 RETENTION OF URINE, UNSPECIFIED: ICD-10-CM

## 2020-08-23 DIAGNOSIS — K43.6 OTHER AND UNSPECIFIED VENTRAL HERNIA WITH OBSTRUCTION, WITHOUT GANGRENE: ICD-10-CM

## 2020-08-23 DIAGNOSIS — G62.9 POLYNEUROPATHY, UNSPECIFIED: ICD-10-CM

## 2020-08-23 DIAGNOSIS — Z85.46 PERSONAL HISTORY OF MALIGNANT NEOPLASM OF PROSTATE: ICD-10-CM

## 2020-08-23 DIAGNOSIS — H91.93 UNSPECIFIED HEARING LOSS, BILATERAL: ICD-10-CM

## 2020-09-13 NOTE — DATA REVIEWED
The patient is a 38y Female complaining of [No studies available for review at this time] : No studies available for review at this time

## 2020-09-29 PROBLEM — G62.9 POLYNEUROPATHY, UNSPECIFIED: Chronic | Status: ACTIVE | Noted: 2020-08-10

## 2020-09-29 PROBLEM — C61 MALIGNANT NEOPLASM OF PROSTATE: Chronic | Status: ACTIVE | Noted: 2020-08-10

## 2020-09-29 PROBLEM — E78.5 HYPERLIPIDEMIA, UNSPECIFIED: Chronic | Status: ACTIVE | Noted: 2020-08-10

## 2020-09-29 PROBLEM — H40.9 UNSPECIFIED GLAUCOMA: Chronic | Status: ACTIVE | Noted: 2020-08-10

## 2020-09-29 PROBLEM — H54.40 BLINDNESS, ONE EYE, UNSPECIFIED EYE: Chronic | Status: ACTIVE | Noted: 2020-08-10

## 2020-09-29 PROBLEM — H91.90 UNSPECIFIED HEARING LOSS, UNSPECIFIED EAR: Chronic | Status: ACTIVE | Noted: 2020-08-10

## 2020-09-29 PROBLEM — E78.00 PURE HYPERCHOLESTEROLEMIA, UNSPECIFIED: Chronic | Status: ACTIVE | Noted: 2020-08-10

## 2020-09-29 PROBLEM — C80.1 MALIGNANT (PRIMARY) NEOPLASM, UNSPECIFIED: Chronic | Status: ACTIVE | Noted: 2020-08-10

## 2020-10-02 ENCOUNTER — APPOINTMENT (OUTPATIENT)
Dept: OPHTHALMOLOGY | Facility: CLINIC | Age: 85
End: 2020-10-02
Payer: MEDICARE

## 2020-10-02 ENCOUNTER — NON-APPOINTMENT (OUTPATIENT)
Age: 85
End: 2020-10-02

## 2020-10-02 PROCEDURE — 92071 CONTACT LENS FITTING FOR TX: CPT | Mod: RT

## 2020-10-02 PROCEDURE — 92012 INTRM OPH EXAM EST PATIENT: CPT

## 2020-10-15 ENCOUNTER — APPOINTMENT (OUTPATIENT)
Dept: OTOLARYNGOLOGY | Facility: CLINIC | Age: 85
End: 2020-10-15
Payer: MEDICARE

## 2020-10-15 VITALS
DIASTOLIC BLOOD PRESSURE: 78 MMHG | HEIGHT: 69 IN | WEIGHT: 177 LBS | HEART RATE: 60 BPM | SYSTOLIC BLOOD PRESSURE: 129 MMHG | BODY MASS INDEX: 26.22 KG/M2 | TEMPERATURE: 98 F

## 2020-10-15 PROCEDURE — 69210 REMOVE IMPACTED EAR WAX UNI: CPT

## 2020-10-15 PROCEDURE — 99214 OFFICE O/P EST MOD 30 MIN: CPT | Mod: 25

## 2020-10-15 NOTE — ASSESSMENT
[FreeTextEntry1] : She has hearing aids for sensorineural hearing loss feeling his hearing is deteriorated somewhat on examination cerumen impaction bilaterally curetted out resolve his issues will follow-up with us as needed.

## 2020-10-15 NOTE — HISTORY OF PRESENT ILLNESS
[de-identified] : PAtient is here today with no changes in hearing since the last visit.  He does not have any ringing in the ears or dizziness. He does not have any nasal congestion or runny nose. He does not have any issues with nasal congestion or runny nose today

## 2020-12-14 ENCOUNTER — APPOINTMENT (OUTPATIENT)
Dept: OPHTHALMOLOGY | Facility: CLINIC | Age: 85
End: 2020-12-14
Payer: MEDICARE

## 2020-12-14 ENCOUNTER — NON-APPOINTMENT (OUTPATIENT)
Age: 85
End: 2020-12-14

## 2020-12-14 PROCEDURE — 92071 CONTACT LENS FITTING FOR TX: CPT | Mod: RT

## 2020-12-14 PROCEDURE — 92012 INTRM OPH EXAM EST PATIENT: CPT

## 2021-01-14 ENCOUNTER — APPOINTMENT (OUTPATIENT)
Dept: OTOLARYNGOLOGY | Facility: CLINIC | Age: 86
End: 2021-01-14
Payer: MEDICARE

## 2021-01-14 VITALS
BODY MASS INDEX: 26.22 KG/M2 | HEART RATE: 65 BPM | SYSTOLIC BLOOD PRESSURE: 134 MMHG | WEIGHT: 177 LBS | DIASTOLIC BLOOD PRESSURE: 76 MMHG | HEIGHT: 69 IN | TEMPERATURE: 97.7 F

## 2021-01-14 DIAGNOSIS — H90.3 SENSORINEURAL HEARING LOSS, BILATERAL: ICD-10-CM

## 2021-01-14 DIAGNOSIS — H61.23 IMPACTED CERUMEN, BILATERAL: ICD-10-CM

## 2021-01-14 PROCEDURE — 99212 OFFICE O/P EST SF 10 MIN: CPT | Mod: 25

## 2021-01-14 PROCEDURE — 69210 REMOVE IMPACTED EAR WAX UNI: CPT

## 2021-01-14 NOTE — HISTORY OF PRESENT ILLNESS
[de-identified] : Patient is doing well overall with minor recurrence of ear clogging bilatearlly. He does not have any pain in the ears or pressure and is not having any ringing in the ears or dizziness. He does not have any issues with nasal congsetion or runny nose

## 2021-01-14 NOTE — HISTORY OF PRESENT ILLNESS
[de-identified] : Patient is doing well overall with minor recurrence of ear clogging bilatearlly. He does not have any pain in the ears or pressure and is not having any ringing in the ears or dizziness. He does not have any issues with nasal congsetion or runny nose

## 2021-01-14 NOTE — ASSESSMENT
[FreeTextEntry1] : Patient returns for a regular ear check and cleaning. Cerumen was removed bilaterally without issue. Otherwise he is doing well and has actually lost some weight during the pandemic due to not being able to go out and eat. He will return in 3 months for another ear check

## 2021-03-12 ENCOUNTER — APPOINTMENT (OUTPATIENT)
Dept: OPHTHALMOLOGY | Facility: CLINIC | Age: 86
End: 2021-03-12

## 2021-04-05 ENCOUNTER — NON-APPOINTMENT (OUTPATIENT)
Age: 86
End: 2021-04-05

## 2021-04-05 ENCOUNTER — APPOINTMENT (OUTPATIENT)
Dept: OPHTHALMOLOGY | Facility: CLINIC | Age: 86
End: 2021-04-05
Payer: MEDICARE

## 2021-04-05 PROCEDURE — 92071 CONTACT LENS FITTING FOR TX: CPT | Mod: RT

## 2021-04-05 PROCEDURE — 92012 INTRM OPH EXAM EST PATIENT: CPT

## 2021-04-15 ENCOUNTER — APPOINTMENT (OUTPATIENT)
Dept: OTOLARYNGOLOGY | Facility: CLINIC | Age: 86
End: 2021-04-15

## 2021-05-06 ENCOUNTER — APPOINTMENT (OUTPATIENT)
Dept: OPHTHALMOLOGY | Facility: CLINIC | Age: 86
End: 2021-05-06
Payer: MEDICARE

## 2021-05-06 ENCOUNTER — NON-APPOINTMENT (OUTPATIENT)
Age: 86
End: 2021-05-06

## 2021-05-06 PROCEDURE — 92012 INTRM OPH EXAM EST PATIENT: CPT

## 2021-05-06 PROCEDURE — 92071 CONTACT LENS FITTING FOR TX: CPT | Mod: RT

## 2021-05-27 ENCOUNTER — NON-APPOINTMENT (OUTPATIENT)
Age: 86
End: 2021-05-27

## 2021-05-27 ENCOUNTER — APPOINTMENT (OUTPATIENT)
Dept: OPHTHALMOLOGY | Facility: CLINIC | Age: 86
End: 2021-05-27
Payer: MEDICARE

## 2021-05-27 PROCEDURE — 76514 ECHO EXAM OF EYE THICKNESS: CPT

## 2021-05-27 PROCEDURE — 92134 CPTRZ OPH DX IMG PST SGM RTA: CPT

## 2021-05-27 PROCEDURE — 92012 INTRM OPH EXAM EST PATIENT: CPT

## 2021-05-27 PROCEDURE — 92083 EXTENDED VISUAL FIELD XM: CPT

## 2021-08-23 ENCOUNTER — NON-APPOINTMENT (OUTPATIENT)
Age: 86
End: 2021-08-23

## 2021-08-23 ENCOUNTER — APPOINTMENT (OUTPATIENT)
Dept: OPHTHALMOLOGY | Facility: CLINIC | Age: 86
End: 2021-08-23
Payer: MEDICARE

## 2021-08-23 PROCEDURE — 92071 CONTACT LENS FITTING FOR TX: CPT | Mod: RT

## 2021-08-23 PROCEDURE — 92012 INTRM OPH EXAM EST PATIENT: CPT

## 2021-09-23 ENCOUNTER — APPOINTMENT (OUTPATIENT)
Dept: OPHTHALMOLOGY | Facility: CLINIC | Age: 86
End: 2021-09-23
Payer: MEDICARE

## 2021-09-23 ENCOUNTER — NON-APPOINTMENT (OUTPATIENT)
Age: 86
End: 2021-09-23

## 2021-09-23 PROCEDURE — 92133 CPTRZD OPH DX IMG PST SGM ON: CPT

## 2021-09-23 PROCEDURE — 92083 EXTENDED VISUAL FIELD XM: CPT

## 2021-09-23 PROCEDURE — 92012 INTRM OPH EXAM EST PATIENT: CPT

## 2021-11-16 NOTE — PATIENT PROFILE ADULT - NSPRESCRALCSCORE_GEN_A_NUR_CAL
normal appearance , without tenderness upon palpation , no deformities , trachea midline , Thyroid normal size , no thyroid nodules , no masses , no JVD , thyroid nontender 3

## 2022-01-07 ENCOUNTER — APPOINTMENT (OUTPATIENT)
Dept: OPHTHALMOLOGY | Facility: CLINIC | Age: 87
End: 2022-01-07
Payer: MEDICARE

## 2022-01-07 ENCOUNTER — NON-APPOINTMENT (OUTPATIENT)
Age: 87
End: 2022-01-07

## 2022-01-07 PROCEDURE — 92083 EXTENDED VISUAL FIELD XM: CPT

## 2022-01-07 PROCEDURE — 92250 FUNDUS PHOTOGRAPHY W/I&R: CPT

## 2022-01-07 PROCEDURE — 92012 INTRM OPH EXAM EST PATIENT: CPT

## 2022-02-28 ENCOUNTER — APPOINTMENT (OUTPATIENT)
Dept: OPHTHALMOLOGY | Facility: CLINIC | Age: 87
End: 2022-02-28
Payer: MEDICARE

## 2022-02-28 ENCOUNTER — NON-APPOINTMENT (OUTPATIENT)
Age: 87
End: 2022-02-28

## 2022-02-28 PROCEDURE — 92071 CONTACT LENS FITTING FOR TX: CPT | Mod: RT

## 2022-02-28 PROCEDURE — 92012 INTRM OPH EXAM EST PATIENT: CPT

## 2022-04-28 ENCOUNTER — APPOINTMENT (OUTPATIENT)
Dept: NEPHROLOGY | Facility: CLINIC | Age: 87
End: 2022-04-28
Payer: MEDICARE

## 2022-04-28 VITALS
DIASTOLIC BLOOD PRESSURE: 78 MMHG | OXYGEN SATURATION: 68 % | RESPIRATION RATE: 12 BRPM | SYSTOLIC BLOOD PRESSURE: 126 MMHG | HEART RATE: 60 BPM

## 2022-04-28 DIAGNOSIS — N18.31 CHRONIC KIDNEY DISEASE, STAGE 3A: ICD-10-CM

## 2022-04-28 DIAGNOSIS — N18.9 ACUTE KIDNEY FAILURE, UNSPECIFIED: ICD-10-CM

## 2022-04-28 DIAGNOSIS — N17.9 ACUTE KIDNEY FAILURE, UNSPECIFIED: ICD-10-CM

## 2022-04-28 PROCEDURE — 99203 OFFICE O/P NEW LOW 30 MIN: CPT

## 2022-04-28 RX ORDER — CEPHALEXIN 500 MG/1
500 CAPSULE ORAL
Qty: 14 | Refills: 0 | Status: DISCONTINUED | COMMUNITY
Start: 2017-04-11 | End: 2022-04-28

## 2022-04-28 NOTE — HISTORY OF PRESENT ILLNESS
[FreeTextEntry1] : asked to see by Dr Rayo for SERENE \par 89M W man PMHx glaucoma, BPH, prostate ca in trial who presents for elevated Cr. Pt was at PCP office 4/6/22 who found Cr to be 1.5. Since then has been drinking more fluid and feelinmg much better and thinks has been dehydrated. . Recent ER visit for TIA 4/22 with Cr 1.14. Urines negative protein, cells. Denies CP SOB fever abdominal pain or difficulty urinating. Does not take BP @home. No DM or HTN. Appetite ok.

## 2022-04-28 NOTE — CONSULT LETTER
[Dear  ___] : Dear  [unfilled], [Consult Letter:] : I had the pleasure of evaluating your patient, [unfilled]. [Please see my note below.] : Please see my note below. [Consult Closing:] : Thank you very much for allowing me to participate in the care of this patient.  If you have any questions, please do not hesitate to contact me. [Sincerely,] : Sincerely, [FreeTextEntry3] : North Craig MD, AKILAH\par Division of Nephrology\par [facility]\par  of Medicine\par [facility] [employer] \par \par \par \par \par

## 2022-04-28 NOTE — PHYSICAL EXAM
[General Appearance - Alert] : alert [General Appearance - In No Acute Distress] : in no acute distress [Auscultation Breath Sounds / Voice Sounds] : lungs were clear to auscultation bilaterally [Heart Rate And Rhythm] : heart rate was normal and rhythm regular [Heart Sounds] : normal S1 and S2 [Heart Sounds Gallop] : no gallops [Murmurs] : no murmurs [Heart Sounds Pericardial Friction Rub] : no pericardial rub [Edema] : there was no peripheral edema [Bowel Sounds] : normal bowel sounds [Abdomen Soft] : soft [Abdomen Tenderness] : non-tender [Abdomen Mass (___ Cm)] : no abdominal mass palpated [Abnormal Walk] : normal gait [No Focal Deficits] : no focal deficits [Oriented To Time, Place, And Person] : oriented to person, place, and time [Impaired Insight] : insight and judgment were intact [Affect] : the affect was normal [Mood] : the mood was normal [Sclera] : the sclera and conjunctiva were normal [No CVA Tenderness] : no ~M costovertebral angle tenderness [] : no rash

## 2022-04-28 NOTE — ASSESSMENT
[FreeTextEntry1] : mild CKD likeky aging related nephrosclerosis- s/p acute rise in creatinine that has resolved with more hydration and seems may have been related to hydration\par UA benign w/o evidence nephritis\par BP and volume appear good\par can f/u PRN if change

## 2022-06-10 ENCOUNTER — APPOINTMENT (OUTPATIENT)
Dept: OPHTHALMOLOGY | Facility: CLINIC | Age: 87
End: 2022-06-10
Payer: MEDICARE

## 2022-06-10 ENCOUNTER — NON-APPOINTMENT (OUTPATIENT)
Age: 87
End: 2022-06-10

## 2022-06-10 PROCEDURE — 92071 CONTACT LENS FITTING FOR TX: CPT | Mod: RT

## 2022-06-10 PROCEDURE — 92012 INTRM OPH EXAM EST PATIENT: CPT

## 2022-07-01 ENCOUNTER — APPOINTMENT (OUTPATIENT)
Dept: OPHTHALMOLOGY | Facility: CLINIC | Age: 87
End: 2022-07-01

## 2022-09-19 ENCOUNTER — NON-APPOINTMENT (OUTPATIENT)
Age: 87
End: 2022-09-19

## 2022-09-19 ENCOUNTER — APPOINTMENT (OUTPATIENT)
Dept: OPHTHALMOLOGY | Facility: CLINIC | Age: 87
End: 2022-09-19

## 2022-09-19 PROCEDURE — 92012 INTRM OPH EXAM EST PATIENT: CPT

## 2022-12-15 ENCOUNTER — APPOINTMENT (OUTPATIENT)
Dept: OPHTHALMOLOGY | Facility: CLINIC | Age: 87
End: 2022-12-15

## 2022-12-15 ENCOUNTER — NON-APPOINTMENT (OUTPATIENT)
Age: 87
End: 2022-12-15

## 2022-12-15 PROCEDURE — 92012 INTRM OPH EXAM EST PATIENT: CPT

## 2022-12-19 ENCOUNTER — APPOINTMENT (OUTPATIENT)
Dept: OPHTHALMOLOGY | Facility: CLINIC | Age: 87
End: 2022-12-19

## 2023-04-03 ENCOUNTER — APPOINTMENT (OUTPATIENT)
Dept: OPHTHALMOLOGY | Facility: CLINIC | Age: 88
End: 2023-04-03

## 2023-09-06 ENCOUNTER — APPOINTMENT (OUTPATIENT)
Dept: UROLOGY | Facility: CLINIC | Age: 88
End: 2023-09-06
Payer: MEDICARE

## 2023-09-06 VITALS
BODY MASS INDEX: 25.62 KG/M2 | WEIGHT: 179 LBS | OXYGEN SATURATION: 96 % | HEART RATE: 64 BPM | HEIGHT: 70 IN | SYSTOLIC BLOOD PRESSURE: 127 MMHG | DIASTOLIC BLOOD PRESSURE: 79 MMHG

## 2023-09-06 DIAGNOSIS — Z78.9 OTHER SPECIFIED HEALTH STATUS: ICD-10-CM

## 2023-09-06 DIAGNOSIS — N40.0 BENIGN PROSTATIC HYPERPLASIA WITHOUT LOWER URINARY TRACT SYMPMS: ICD-10-CM

## 2023-09-06 PROCEDURE — 99203 OFFICE O/P NEW LOW 30 MIN: CPT

## 2023-09-06 RX ORDER — DORZOLAMIDE HYDROCHLORIDE 20 MG/ML
2 SOLUTION OPHTHALMIC
Refills: 0 | Status: ACTIVE | COMMUNITY

## 2023-09-06 RX ORDER — PREDNISONE 50 MG/1
TABLET ORAL
Refills: 0 | Status: ACTIVE | COMMUNITY

## 2023-09-06 RX ORDER — EZETIMIBE 10 MG/1
10 TABLET ORAL
Refills: 0 | Status: ACTIVE | COMMUNITY

## 2023-09-06 RX ORDER — TAMSULOSIN HYDROCHLORIDE 0.4 MG/1
0.4 CAPSULE ORAL
Qty: 180 | Refills: 3 | Status: ACTIVE | COMMUNITY
Start: 2023-09-06 | End: 1900-01-01

## 2023-09-06 NOTE — ASSESSMENT
[FreeTextEntry1] : Patient is a 89-year-old man who presents today to [facility] care.  He has a distant history of prostate cancer treated 20 years ago with external beam radiotherapy.  He also has longstanding BPH with lower tract urinary symptoms on Flomax 0.4 mg nightly.  Over the last few years he has experienced increased frequency, urgency and decreased force of stream.  He has had no interval hematuria, dysuria or pelvic pain.  His digital rectal exam today shows a flat firm prostate with no masses.  Assessment and plan 1.  Prostate cancer We will check PSA today 2.  BPH with lower tract urinary symptoms We will increase Flomax to 0.8 mg daily and have him follow-up in 1 month.

## 2023-09-06 NOTE — PHYSICAL EXAM
[General Appearance - Well Developed] : well developed [Normal Appearance] : normal appearance [General Appearance - In No Acute Distress] : no acute distress [Respiration, Rhythm And Depth] : normal respiratory rhythm and effort [Abdomen Hernia] : no hernia was discovered [Urethral Meatus] : meatus normal [Penis Abnormality] : normal circumcised penis [Scrotum] : the scrotum was normal [Epididymis] : the epididymides were normal [Testes Tenderness] : no tenderness of the testes [Testes Mass (___cm)] : there were no testicular masses [Anus Abnormality] : the anus and perineum were normal [Prostate Tenderness] : the prostate was not tender [No Prostate Nodules] : no prostate nodules [FreeTextEntry1] : flat no masses [] : no rash [Oriented To Time, Place, And Person] : oriented to person, place, and time [Inguinal Lymph Nodes Enlarged Bilaterally] : inguinal

## 2023-09-07 LAB — PSA, POST - PROSTATECTOMY: 17 NG/ML

## 2023-09-13 ENCOUNTER — APPOINTMENT (OUTPATIENT)
Dept: UROLOGY | Facility: CLINIC | Age: 88
End: 2023-09-13
Payer: MEDICARE

## 2023-09-13 PROCEDURE — 99024 POSTOP FOLLOW-UP VISIT: CPT

## 2023-09-14 LAB
ALBUMIN SERPL ELPH-MCNC: 4.2 G/DL
ALP BLD-CCNC: 44 U/L
ALT SERPL-CCNC: 15 U/L
ANION GAP SERPL CALC-SCNC: 8 MMOL/L
AST SERPL-CCNC: 16 U/L
BILIRUB SERPL-MCNC: 0.3 MG/DL
BUN SERPL-MCNC: 15 MG/DL
CALCIUM SERPL-MCNC: 9.2 MG/DL
CHLORIDE SERPL-SCNC: 104 MMOL/L
CO2 SERPL-SCNC: 27 MMOL/L
CREAT SERPL-MCNC: 1.32 MG/DL
EGFR: 51 ML/MIN/1.73M2
GLUCOSE SERPL-MCNC: 97 MG/DL
POTASSIUM SERPL-SCNC: 5.2 MMOL/L
PROT SERPL-MCNC: 6.2 G/DL
SODIUM SERPL-SCNC: 139 MMOL/L

## 2023-10-02 ENCOUNTER — APPOINTMENT (OUTPATIENT)
Dept: UROLOGY | Facility: CLINIC | Age: 88
End: 2023-10-02
Payer: MEDICARE

## 2023-10-02 VITALS
HEIGHT: 70 IN | HEART RATE: 69 BPM | WEIGHT: 179 LBS | SYSTOLIC BLOOD PRESSURE: 127 MMHG | BODY MASS INDEX: 25.62 KG/M2 | DIASTOLIC BLOOD PRESSURE: 80 MMHG

## 2023-10-02 DIAGNOSIS — C61 MALIGNANT NEOPLASM OF PROSTATE: ICD-10-CM

## 2023-10-02 DIAGNOSIS — N28.89 OTHER SPECIFIED DISORDERS OF KIDNEY AND URETER: ICD-10-CM

## 2023-10-02 PROCEDURE — 99214 OFFICE O/P EST MOD 30 MIN: CPT

## 2023-10-23 PROBLEM — N28.89 RENAL MASS: Status: ACTIVE | Noted: 2023-10-23

## 2023-10-23 PROBLEM — C61 PROSTATE CANCER: Status: ACTIVE | Noted: 2023-09-07

## 2024-01-10 ENCOUNTER — APPOINTMENT (OUTPATIENT)
Dept: UROLOGY | Facility: CLINIC | Age: 89
End: 2024-01-10